# Patient Record
Sex: FEMALE | Race: BLACK OR AFRICAN AMERICAN | NOT HISPANIC OR LATINO | Employment: STUDENT | URBAN - METROPOLITAN AREA
[De-identification: names, ages, dates, MRNs, and addresses within clinical notes are randomized per-mention and may not be internally consistent; named-entity substitution may affect disease eponyms.]

---
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
## 2023-02-10 ENCOUNTER — MEDICAL CORRESPONDENCE (OUTPATIENT)
Dept: HEALTH INFORMATION MANAGEMENT | Facility: CLINIC | Age: 23
End: 2023-02-10
Payer: COMMERCIAL

## 2023-02-21 ENCOUNTER — TRANSCRIBE ORDERS (OUTPATIENT)
Dept: OTHER | Age: 23
End: 2023-02-21

## 2023-02-21 DIAGNOSIS — R41.840 DIFFICULTY CONCENTRATING: Primary | ICD-10-CM

## 2023-09-23 ENCOUNTER — OFFICE VISIT (OUTPATIENT)
Dept: URGENT CARE | Facility: URGENT CARE | Age: 23
End: 2023-09-23
Payer: COMMERCIAL

## 2023-09-23 VITALS
OXYGEN SATURATION: 97 % | TEMPERATURE: 98.5 F | WEIGHT: 125 LBS | HEART RATE: 84 BPM | DIASTOLIC BLOOD PRESSURE: 73 MMHG | RESPIRATION RATE: 16 BRPM | SYSTOLIC BLOOD PRESSURE: 108 MMHG

## 2023-09-23 DIAGNOSIS — M54.2 CERVICALGIA: ICD-10-CM

## 2023-09-23 DIAGNOSIS — S13.4XXA WHIPLASH INJURY TO NECK, INITIAL ENCOUNTER: Primary | ICD-10-CM
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
## 2023-09-23 PROCEDURE — 99213 OFFICE O/P EST LOW 20 MIN: CPT | Performed by: NURSE PRACTITIONER

## 2023-09-23 ASSESSMENT — PAIN SCALES - GENERAL: PAINLEVEL: MODERATE PAIN (4)
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
## 2023-09-23 NOTE — PATIENT INSTRUCTIONS
Ibuprofen 600 mg every 6 hours as needed with food     Alternate heat with ice every 3-4 hour for about 10 minutes.

## 2024-02-03 ENCOUNTER — HEALTH MAINTENANCE LETTER (OUTPATIENT)
Age: 24
End: 2024-02-03

## 2024-03-25 NOTE — PROGRESS NOTES
[facility] Dermatology Note  Encounter Date: Mar 26, 2024  Office Visit       Dermatology Problem List:  1.  Non-scarring alopecia with seborrheic dermatitis  - Current tx: ketoconazole shampoo, Fluocinolone oil,   - Prior tx: none.   - Labs:  03/26/2024: CBC, CMP, Vit D, TSH, Iron studies (ferritin, iron), androgen studies (free and total testosterone, DHEAS)   Studying: psychology  ____________________________________________    Assessment & Plan:     # Non-scarring alopecia with seborrheic dermatitis.   - LPPAI score : none    - SALT score none  - Hair metrix performed today   - Labs ordered: CBC, CMP, Vit D, TSH, Iron studies (ferritin, iron), androgen studies (free and total testosterone, DHEAS)   - Start ketoconazole 2% shampoo in the shower 3x/week. If irritating, may alternate with Head and Shoulders.   - Start applying fluocinolone (derma-smooth) oil on the scalp before bedtime. For severe flares, may use this up to twice daily.   - Continue As I Am shampoo otc          Procedures Performed:   Hair Metrix    Hair metrix: 03/26/2024  Frontal 113  Mid scalp: 148  Vertex 163  Occipital 141  Right temple 75  Left temple 67    Follow-up: 2 week(s) virtually (telephone with photos), to discuss lab resutls  and 5-6 months in office visit follow up    Staff and Scribe:     Scribe Disclosure:   I, Christiane Billingsley, am serving as a scribe; to document services personally performed by Evelyn Celestin MD -based on data collection and the provider's statements to me.     Provider Disclosure:  I agree with above History, Review of Systems, Physical exam and Plan.  I have reviewed the content of the documentation and have edited it as needed. I have personally performed the services documented here and the documentation accurately represents those services and the decisions I have made.      Electronically signed by:  Evelyn Celestin MD  Professor   Department of Dermatology  [facility]  [place]     ____________________________________________    CC: Hair Loss (Patient reports frontal hair loss for the past year.  )    Ms. Nisreen Crespo is a 23 year old female who presents as a new patient for evaluation of hair loss.     Today, she presents with scalp hair loss. She rama her hair every few months. She is using castor oil 2-3 times a week. She is doing protective hair styles to decrease tension. Using otc shampoo and  peppermint oil. Patient denies any recent hair loss labs.      - Affected areas: scalp  No Scalp pain   No Scalp burning   No Scalp itching    No Eyebrow changes    No Eyelash changes   No Beard changes    No Other body hair changes    No Nail changes    No Additional symptoms? (please list below)   - Current treatments: otc shampoo, castor oil and peppermint oil  - Prior biopsies: none   - Prior labs: none   - Scalp or hair care habits/products: castor oil  - Menses: regular and without heavy flow post-menopausal yes   - Unwanted hair growth/hirsutism: none  - Diet (meat, vegetarian, mixed): none  - Recent weight loss: none  - Personal history of thyroid dysfunction or autoimmune disease: none  - Family history of hair loss: yes  - Family history of autoimmune disease: none  - Major family, financial, school/work, or other social stressors in the few months prior to hair loss: none  - Major recent illness/hospitalizations: none      Patient is otherwise feeling well, in usual state of health, and has no additional skin concerns today.         Labs:  None reviewed.    Physical Exam:  Vitals: /71   Pulse 81   GEN: Well developed, well-nourished, in no acute distress, in a pleasant mood.    SKIN: Focused examination of scalp face and hands was performed.  - no diffuse erythema   - no perifollicular erythema  - mild perifollicular scale   - mild scaling of the scalp   - negative hair pull test   - normal eyelash density  - normal eyebrow density  - no nail pitting or  dystrophy   - no scalp folliculitis/pustules   - In comparison to prior photographs, none  .   - No other lesions of concern on areas examined.       Medications:  Current Outpatient Medications   Medication    ORDER FOR DME    ORDER FOR DME     No current facility-administered medications for this visit.      Past Medical History:   There is no problem list on file for this patient.    No past medical history on file.    CC Referred Self, MD  No address on file on close of this encounter.

## 2024-03-26 ENCOUNTER — OFFICE VISIT (OUTPATIENT)
Dept: DERMATOLOGY | Facility: CLINIC | Age: 24
End: 2024-03-26
Payer: COMMERCIAL

## 2024-03-26 VITALS — HEART RATE: 81 BPM | DIASTOLIC BLOOD PRESSURE: 71 MMHG | SYSTOLIC BLOOD PRESSURE: 104 MMHG

## 2024-03-26 DIAGNOSIS — L65.9 LOSS OF HAIR: Primary | ICD-10-CM

## 2024-03-26 DIAGNOSIS — L30.9 DERMATITIS: ICD-10-CM

## 2024-03-26 LAB
ALBUMIN SERPL BCG-MCNC: 4.8 G/DL (ref 3.5–5.2)
ALP SERPL-CCNC: 66 U/L (ref 40–150)
ALT SERPL W P-5'-P-CCNC: 29 U/L (ref 0–50)
ANION GAP SERPL CALCULATED.3IONS-SCNC: 10 MMOL/L (ref 7–15)
AST SERPL W P-5'-P-CCNC: 37 U/L (ref 0–45)
BASOPHILS # BLD AUTO: 0 10E3/UL (ref 0–0.2)
BASOPHILS NFR BLD AUTO: 0 %
BILIRUB SERPL-MCNC: 0.2 MG/DL
BUN SERPL-MCNC: 9.6 MG/DL (ref 6–20)
CALCIUM SERPL-MCNC: 9.7 MG/DL (ref 8.6–10)
CHLORIDE SERPL-SCNC: 102 MMOL/L (ref 98–107)
CREAT SERPL-MCNC: 0.69 MG/DL (ref 0.51–0.95)
DEPRECATED HCO3 PLAS-SCNC: 26 MMOL/L (ref 22–29)
EGFRCR SERPLBLD CKD-EPI 2021: >90 ML/MIN/1.73M2
EOSINOPHIL # BLD AUTO: 0 10E3/UL (ref 0–0.7)
EOSINOPHIL NFR BLD AUTO: 0 %
ERYTHROCYTE [DISTWIDTH] IN BLOOD BY AUTOMATED COUNT: 12.7 % (ref 10–15)
FERRITIN SERPL-MCNC: 40 NG/ML (ref 6–175)
GLUCOSE SERPL-MCNC: 91 MG/DL (ref 70–99)
HCT VFR BLD AUTO: 39.4 % (ref 35–47)
HGB BLD-MCNC: 12.6 G/DL (ref 11.7–15.7)
IMM GRANULOCYTES # BLD: 0 10E3/UL
IMM GRANULOCYTES NFR BLD: 0 %
IRON BINDING CAPACITY (ROCHE): 326 UG/DL (ref 240–430)
IRON SATN MFR SERPL: 16 % (ref 15–46)
IRON SERPL-MCNC: 51 UG/DL (ref 37–145)
LYMPHOCYTES # BLD AUTO: 1.1 10E3/UL (ref 0.8–5.3)
LYMPHOCYTES NFR BLD AUTO: 32 %
MCH RBC QN AUTO: 29.2 PG (ref 26.5–33)
MCHC RBC AUTO-ENTMCNC: 32 G/DL (ref 31.5–36.5)
MCV RBC AUTO: 91 FL (ref 78–100)
MONOCYTES # BLD AUTO: 0.3 10E3/UL (ref 0–1.3)
MONOCYTES NFR BLD AUTO: 7 %
NEUTROPHILS # BLD AUTO: 2 10E3/UL (ref 1.6–8.3)
NEUTROPHILS NFR BLD AUTO: 59 %
NRBC # BLD AUTO: 0 10E3/UL
NRBC BLD AUTO-RTO: 0 /100
PLATELET # BLD AUTO: 304 10E3/UL (ref 150–450)
POTASSIUM SERPL-SCNC: 4.1 MMOL/L (ref 3.4–5.3)
PROT SERPL-MCNC: 8.9 G/DL (ref 6.4–8.3)
RBC # BLD AUTO: 4.32 10E6/UL (ref 3.8–5.2)
SHBG SERPL-SCNC: 62 NMOL/L (ref 30–135)
SODIUM SERPL-SCNC: 138 MMOL/L (ref 135–145)
TSH SERPL DL<=0.005 MIU/L-ACNC: 1.66 UIU/ML (ref 0.3–4.2)
VIT D+METAB SERPL-MCNC: 10 NG/ML (ref 20–50)
WBC # BLD AUTO: 3.4 10E3/UL (ref 4–11)

## 2024-03-26 PROCEDURE — 36415 COLL VENOUS BLD VENIPUNCTURE: CPT | Performed by: DERMATOLOGY

## 2024-03-26 PROCEDURE — 85025 COMPLETE CBC W/AUTO DIFF WBC: CPT | Performed by: DERMATOLOGY

## 2024-03-26 PROCEDURE — 99204 OFFICE O/P NEW MOD 45 MIN: CPT | Performed by: DERMATOLOGY

## 2024-03-26 PROCEDURE — 82627 DEHYDROEPIANDROSTERONE: CPT | Performed by: DERMATOLOGY

## 2024-03-26 PROCEDURE — 82728 ASSAY OF FERRITIN: CPT | Performed by: DERMATOLOGY

## 2024-03-26 PROCEDURE — 84270 ASSAY OF SEX HORMONE GLOBUL: CPT | Performed by: DERMATOLOGY

## 2024-03-26 PROCEDURE — 83540 ASSAY OF IRON: CPT | Performed by: DERMATOLOGY

## 2024-03-26 PROCEDURE — 84443 ASSAY THYROID STIM HORMONE: CPT | Performed by: DERMATOLOGY

## 2024-03-26 PROCEDURE — 82306 VITAMIN D 25 HYDROXY: CPT | Performed by: DERMATOLOGY

## 2024-03-26 PROCEDURE — 80053 COMPREHEN METABOLIC PANEL: CPT | Performed by: DERMATOLOGY

## 2024-03-26 PROCEDURE — 84403 ASSAY OF TOTAL TESTOSTERONE: CPT | Performed by: DERMATOLOGY

## 2024-03-26 PROCEDURE — 83550 IRON BINDING TEST: CPT | Performed by: DERMATOLOGY

## 2024-03-26 RX ORDER — FLUOCINOLONE ACETONIDE 0.11 MG/ML
OIL TOPICAL
Qty: 118 ML | Refills: 3 | Status: SHIPPED | OUTPATIENT
Start: 2024-03-26

## 2024-03-26 ASSESSMENT — PAIN SCALES - GENERAL: PAINLEVEL: NO PAIN (0)

## 2024-03-26 NOTE — PROGRESS NOTES
HairMetrix Summary (03/26/24)    Frontal anterior (10 cm)    Mid scalp (13 cm)    Vertex (24 cm)    Occipital (30 cm)    Right temporal (9.5, 8 cm)    Left temporal (8.5, 9.5 cm)    Summary

## 2024-03-26 NOTE — NURSING NOTE
Nisreen Crespo's goals for this visit include:   Chief Complaint   Patient presents with    Hair Loss     Patient reports frontal hair loss for the past year.        She requests these members of her care team be copied on today's visit information:     PCP: Clinic, [facility]    Referring Provider:  Referred Self, MD  No address on file    /71   Pulse 81     Do you need any medication refills at today's visit?     Maria Teresa Berman on 3/26/2024 at 10:15 AM

## 2024-03-26 NOTE — LETTER
3/26/2024         RE: Nisreen Crespo  [address]  [place] [address]        Dear Colleague,    Thank you for referring your patient, Nisreen Crespo, to the [facility]. Please see a copy of my visit note below.    [facility] Dermatology Note  Encounter Date: Mar 26, 2024  Office Visit       Dermatology Problem List:  1.  Non-scarring alopecia with seborrheic dermatitis  - Current tx: ketoconazole shampoo, Fluocinolone oil,   - Prior tx: none.   - Labs:  03/26/2024: CBC, CMP, Vit D, TSH, Iron studies (ferritin, iron), androgen studies (free and total testosterone, DHEAS)   Studying: psychology  ____________________________________________    Assessment & Plan:     # Non-scarring alopecia with seborrheic dermatitis.   - LPPAI score : none    - SALT score none  - Hair metrix performed today   - Labs ordered: CBC, CMP, Vit D, TSH, Iron studies (ferritin, iron), androgen studies (free and total testosterone, DHEAS)   - Start ketoconazole 2% shampoo in the shower 3x/week. If irritating, may alternate with Head and Shoulders.   - Start applying fluocinolone (derma-smooth) oil on the scalp before bedtime. For severe flares, may use this up to twice daily.   - Continue As I Am shampoo otc          Procedures Performed:   Hair Metrix    Hair metrix: 03/26/2024  Frontal 113  Mid scalp: 148  Vertex 163  Occipital 141  Right temple 75  Left temple 67    Follow-up: 2 week(s) virtually (telephone with photos), to discuss lab resutls  and 5-6 months in office visit follow up    Staff and Scribe:     Scribe Disclosure:   I, Christiane Billingsley, am serving as a scribe; to document services personally performed by Evelyn Celestin MD -based on data collection and the provider's statements to me.     Provider Disclosure:  I agree with above History, Review of Systems, Physical exam and Plan.  I have reviewed the content of the documentation and have edited it as  needed. I have personally performed the services documented here and the documentation accurately represents those services and the decisions I have made.      Electronically signed by:  Evelyn Celestin MD  Professor   Department of Dermatology  [facility]     ____________________________________________    CC: Hair Loss (Patient reports frontal hair loss for the past year.  )    Ms. Nisreen Crespo is a 23 year old female who presents as a new patient for evaluation of hair loss.     Today, she presents with scalp hair loss. She rama her hair every few months. She is using castor oil 2-3 times a week. She is doing protective hair styles to decrease tension. Using otc shampoo and  peppermint oil. Patient denies any recent hair loss labs.      - Affected areas: scalp  No Scalp pain   No Scalp burning   No Scalp itching    No Eyebrow changes    No Eyelash changes   No Beard changes    No Other body hair changes    No Nail changes    No Additional symptoms? (please list below)   - Current treatments: otc shampoo, castor oil and peppermint oil  - Prior biopsies: none   - Prior labs: none   - Scalp or hair care habits/products: castor oil  - Menses: regular and without heavy flow post-menopausal yes   - Unwanted hair growth/hirsutism: none  - Diet (meat, vegetarian, mixed): none  - Recent weight loss: none  - Personal history of thyroid dysfunction or autoimmune disease: none  - Family history of hair loss: yes  - Family history of autoimmune disease: none  - Major family, financial, school/work, or other social stressors in the few months prior to hair loss: none  - Major recent illness/hospitalizations: none      Patient is otherwise feeling well, in usual state of health, and has no additional skin concerns today.         Labs:  None reviewed.    Physical Exam:  Vitals: /71   Pulse 81   GEN: Well developed, well-nourished, in no acute distress, in a pleasant mood.    SKIN: Focused examination  of scalp face and hands was performed.  - no diffuse erythema   - no perifollicular erythema  - mild perifollicular scale   - mild scaling of the scalp   - negative hair pull test   - normal eyelash density  - normal eyebrow density  - no nail pitting or dystrophy   - no scalp folliculitis/pustules   - In comparison to prior photographs, none  .   - No other lesions of concern on areas examined.       Medications:  Current Outpatient Medications   Medication     ORDER FOR DME     ORDER FOR DME     No current facility-administered medications for this visit.      Past Medical History:   There is no problem list on file for this patient.    No past medical history on file.    CC Referred Self, MD  No address on file on close of this encounter.     HairMetrix Summary (03/26/24)    Frontal anterior (10 cm)    Mid scalp (13 cm)    Vertex (24 cm)    Occipital (30 cm)    Right temporal (9.5, 8 cm)    Left temporal (8.5, 9.5 cm)    Summary         Again, thank you for allowing me to participate in the care of your patient.        Sincerely,        Evelyn Celestin MD

## 2024-03-27 LAB — DHEA-S SERPL-MCNC: 375 UG/DL (ref 35–430)

## 2024-03-28 LAB
TESTOST FREE SERPL-MCNC: 0.28 NG/DL
TESTOST SERPL-MCNC: 24 NG/DL (ref 8–60)

## 2024-04-13 ENCOUNTER — HEALTH MAINTENANCE LETTER (OUTPATIENT)
Age: 24
End: 2024-04-13

## 2024-04-19 ENCOUNTER — TELEPHONE (OUTPATIENT)
Dept: DERMATOLOGY | Facility: CLINIC | Age: 24
End: 2024-04-19
Payer: COMMERCIAL

## 2024-04-19 NOTE — TELEPHONE ENCOUNTER
Patient returned call to clinic.     She was scheduled for a telephone visit with Dr. Celestin on 4/16/2024 at 10:40 am.  She received the phone call to check her in for her telephone visit but she did not get a call from Dr. Celestin.      We need to find a time for patient to be rescheduled for her appointment with Dr. Celestin.

## 2024-04-19 NOTE — TELEPHONE ENCOUNTER
Not able to find a record of call to patient. Left a message for patient to return call to clinic to discuss.

## 2024-04-19 NOTE — TELEPHONE ENCOUNTER
Reached out to patient regarding new appointment time for visit on 4/23/24 at 11:20 am - approved by Mary Dobson.

## 2024-04-22 NOTE — PROGRESS NOTES
[facility] Dermatology Note  Encounter Date: Apr 23, 2024  Telephone. Location of teledermatologist: [facility]. Start time: 11:07 am. End time: 11:13 am.        Dermatology Problem List:  1.  Alopecia primarily along the frontal and temporal hair line, appears non-scarring like alopecia areata but could also be fibrosing alopecia in a pattern distribution  Biopsies: none  - Current tx: ketoconazole shampoo, Fluocinolone oil,   - Prior tx: none.   Labs: 03/26/2024:  - (CBC, CMP, Vit D, TSH, Iron studies (ferritin, iron), androgen studies (free and total testosterone, DHEAS)  - low Vitamin D  - Hair Matrix 3/26/24  Studying: psychology.   ___________________    Assessment & Plan:   Alopecia primarily along the frontal and temporal hair line, appears non-scarring like alopecia areata but could also be fibrosing alopecia in a pattern distribution  # - LPPAI score : none    - SALT score none  - Continue ketoconazole 2% shampoo in the shower 3x/week. If irritating, may alternate with Head and Shoulders.   - Continue applying fluocinolone (derma-smooth) oil on the scalp before bedtime. For severe flares, may use this up to twice daily.   - Continue As I Am shampoo   - Start Vitamin D 5000 international units daily   - recheck labs in 4 months ( Vitamin D, CBC)    Procedures Performed:   None    Hair matrix: 03/26/2024  Frontal 113  Mid scalp: 148  Vertex 163  Occipital 141  Right temple 75  Left temple 67    Follow-up: 5 month(s) in-person, or earlier for new or changing lesions    Staff and Scribe:     Scribe Disclosure:   I, Christiane Billingsley, am serving as a scribe; to document services personally performed by Evelyn Celestin MD -based on data collection and the provider's statements to me.     Provider Disclosure:  I agree with above History, Review of Systems, Physical exam and Plan.  I have reviewed the content of the documentation and have edited it as needed. I  have personally performed the services documented here and the documentation accurately represents those services and the decisions I have made.      Electronically signed by:  Evelyn Celestin MD  Professor   Department of Dermatology  [facility]     ____________________________________________    CC: No chief complaint on file.    HPI:  Ms. Nisreen Crespo is a 23 year old female who presents as a return patient for follow-up of hair loss, diagnosed as non scarring alopecia.   Patient reports no changes with her health since last visit. She states that she does have some bone pain in her legs. She has started the fluocinolone oil, that she feels has been helping with decrease itching and scaling  - Last seen in-clinic on 3/26/24  - Current tx: fluocinolone oil; ketoconazole 2% shampoo, As I Am shampoo    No Any new medications, supplements, or products? (please list below)     No Scalp pain   No Scalp burning   No Scalp itching    No Eyebrow changes    No Eyelash changes   No Beard changes    No Other body hair changes    No Nail changes    No Additional symptoms? (please list below)   nown)    Patient is otherwise feeling well, in usual state of health, and has no additional skin concerns today.         Labs:  CBC  was low- may need to repeat at next labs reviewed.  Vitamin D low   Physical Exam:  Vitals: There were no vitals taken for this visit.  GEN: Well developed, well-nourished, in no acute distress, in a pleasant mood.    SKIN: Teledermatology photos were reviewed; image quality and interpretability:    - In comparison to prior photographs, yes  - No other lesions of concern on areas examined.       Medications:  Current Outpatient Medications   Medication Sig Dispense Refill    Fluocinolone Acetonide Scalp 0.01 % OIL oil Apply to scalp twice a week. OK to leave in overnight. 118 mL 3    ORDER FOR DME by Device route continuous prn Ankle splint - use as instructed (Patient not taking:  Reported on 3/27/2020) 1 Device 0    ORDER FOR DME by Device route continuous prn Crutches - use as instructed (Patient not taking: Reported on 3/27/2020) 1 Device 0     No current facility-administered medications for this visit.      Past Medical History:   There is no problem list on file for this patient.    No past medical history on file.    CC No referring provider defined for this encounter. on close of this encounter.

## 2024-04-23 ENCOUNTER — VIRTUAL VISIT (OUTPATIENT)
Dept: DERMATOLOGY | Facility: CLINIC | Age: 24
End: 2024-04-23
Payer: COMMERCIAL

## 2024-04-23 DIAGNOSIS — L65.9 LOSS OF HAIR: Primary | ICD-10-CM

## 2024-04-23 DIAGNOSIS — R79.89 LOW VITAMIN D LEVEL: ICD-10-CM

## 2024-04-23 DIAGNOSIS — L30.9 DERMATITIS: ICD-10-CM

## 2024-04-23 PROCEDURE — 99441 PR PHYSICIAN TELEPHONE EVALUATION 5-10 MIN: CPT | Performed by: DERMATOLOGY

## 2024-04-23 RX ORDER — KETOCONAZOLE 20 MG/ML
SHAMPOO TOPICAL
Qty: 90 ML | Refills: 11 | Status: SHIPPED | OUTPATIENT
Start: 2024-04-23

## 2024-04-23 NOTE — NURSING NOTE
Nisreen Crespo's goals for this visit include:   Chief Complaint   Patient presents with    Hair Loss     Patient is here for Hair loss follow up, same as last week       She requests these members of her care team be copied on today's visit information:     PCP: Clinic, [facility]    Referring Provider:  No referring provider defined for this encounter.    There were no vitals taken for this visit.    Do you need any medication refills at today's visit?       Jill ARZATE        Teledermatology Nurse Call Patients:     Are you in the [facility] at the time of the encounter? yes    Today's visit will be billed to you and your insurance.    A teledermatology visit is not as thorough as an in-person visit and the quality of the photograph sent may not be of the same quality as that taken by the dermatology clinic.

## 2024-04-23 NOTE — LETTER
4/23/2024      Nisreen Crespo  [address]  [place] [address]      Dear Colleague,    Thank you for referring your patient, Nisreen Crespo, to the [facility]. Please see a copy of my visit note below.    [facility] Dermatology Note  Encounter Date: Apr 23, 2024  Telephone. Location of teledermatologist: [facility]. Start time: 11:07 am. End time: 11:13 am.        Dermatology Problem List:  1.  Alopecia primarily along the frontal and temporal hair line, appears non-scarring like alopecia areata but could also be fibrosing alopecia in a pattern distribution  Biopsies: none  - Current tx: ketoconazole shampoo, Fluocinolone oil,   - Prior tx: none.   Labs: 03/26/2024:  - (CBC, CMP, Vit D, TSH, Iron studies (ferritin, iron), androgen studies (free and total testosterone, DHEAS)  - low Vitamin D  - Hair Matrix 3/26/24  Studying: psychology.   ___________________    Assessment & Plan:   Alopecia primarily along the frontal and temporal hair line, appears non-scarring like alopecia areata but could also be fibrosing alopecia in a pattern distribution  # - LPPAI score : none    - SALT score none  - Continue ketoconazole 2% shampoo in the shower 3x/week. If irritating, may alternate with Head and Shoulders.   - Continue applying fluocinolone (derma-smooth) oil on the scalp before bedtime. For severe flares, may use this up to twice daily.   - Continue As I Am shampoo   - Start Vitamin D 5000 international units daily   - recheck labs in 4 months ( Vitamin D, CBC)    Procedures Performed:   None    Hair matrix: 03/26/2024  Frontal 113  Mid scalp: 148  Vertex 163  Occipital 141  Right temple 75  Left temple 67    Follow-up: 5 month(s) in-person, or earlier for new or changing lesions    Staff and Scribe:     Scribe Disclosure:   I, Christiane Billingsley, am serving as a scribe; to document services personally performed by Evelyn Celestin,  MD -based on data collection and the provider's statements to me.     Provider Disclosure:  I agree with above History, Review of Systems, Physical exam and Plan.  I have reviewed the content of the documentation and have edited it as needed. I have personally performed the services documented here and the documentation accurately represents those services and the decisions I have made.      Electronically signed by:  Evelyn Celestin MD  Professor   Department of Dermatology  [facility]     ____________________________________________    CC: No chief complaint on file.    HPI:  Ms. Nisreen Crespo is a 23 year old female who presents as a return patient for follow-up of hair loss, diagnosed as non scarring alopecia.   Patient reports no changes with her health since last visit. She states that she does have some bone pain in her legs. She has started the fluocinolone oil, that she feels has been helping with decrease itching and scaling  - Last seen in-clinic on 3/26/24  - Current tx: fluocinolone oil; ketoconazole 2% shampoo, As I Am shampoo    No Any new medications, supplements, or products? (please list below)     No Scalp pain   No Scalp burning   No Scalp itching    No Eyebrow changes    No Eyelash changes   No Beard changes    No Other body hair changes    No Nail changes    No Additional symptoms? (please list below)   nown)    Patient is otherwise feeling well, in usual state of health, and has no additional skin concerns today.         Labs:  CBC  was low- may need to repeat at next labs reviewed.  Vitamin D low   Physical Exam:  Vitals: There were no vitals taken for this visit.  GEN: Well developed, well-nourished, in no acute distress, in a pleasant mood.    SKIN: Teledermatology photos were reviewed; image quality and interpretability:    - In comparison to prior photographs, yes  - No other lesions of concern on areas examined.       Medications:  Current Outpatient Medications    Medication Sig Dispense Refill     Fluocinolone Acetonide Scalp 0.01 % OIL oil Apply to scalp twice a week. OK to leave in overnight. 118 mL 3     ORDER FOR DME by Device route continuous prn Ankle splint - use as instructed (Patient not taking: Reported on 3/27/2020) 1 Device 0     ORDER FOR DME by Device route continuous prn Crutches - use as instructed (Patient not taking: Reported on 3/27/2020) 1 Device 0     No current facility-administered medications for this visit.      Past Medical History:   There is no problem list on file for this patient.    No past medical history on file.    CC No referring provider defined for this encounter. on close of this encounter.       Again, thank you for allowing me to participate in the care of your patient.        Sincerely,        Evelyn Celestin MD

## 2025-04-18 PROBLEM — F41.9 ANXIETY: Status: ACTIVE | Noted: 2023-02-10

## 2025-04-18 PROBLEM — R41.840 DIFFICULTY CONCENTRATING: Status: ACTIVE | Noted: 2023-02-10

## 2025-04-18 PROBLEM — R23.8 DRY SCALP: Status: ACTIVE | Noted: 2025-04-18

## 2025-04-18 PROBLEM — F50.011: Status: ACTIVE | Noted: 2025-04-18
